# Patient Record
Sex: FEMALE | Race: WHITE | NOT HISPANIC OR LATINO | Employment: UNEMPLOYED | URBAN - METROPOLITAN AREA
[De-identification: names, ages, dates, MRNs, and addresses within clinical notes are randomized per-mention and may not be internally consistent; named-entity substitution may affect disease eponyms.]

---

## 2023-05-01 ENCOUNTER — OFFICE VISIT (OUTPATIENT)
Dept: PRIMARY CARE | Facility: CLINIC | Age: 53
End: 2023-05-01
Payer: COMMERCIAL

## 2023-05-01 VITALS
WEIGHT: 163 LBS | HEART RATE: 70 BPM | BODY MASS INDEX: 25.53 KG/M2 | SYSTOLIC BLOOD PRESSURE: 110 MMHG | DIASTOLIC BLOOD PRESSURE: 76 MMHG

## 2023-05-01 DIAGNOSIS — H93.11 TINNITUS OF RIGHT EAR: Primary | ICD-10-CM

## 2023-05-01 PROCEDURE — 99213 OFFICE O/P EST LOW 20 MIN: CPT | Performed by: INTERNAL MEDICINE
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
## 2023-05-08 ENCOUNTER — TELEPHONE (OUTPATIENT)
Dept: PRIMARY CARE | Facility: CLINIC | Age: 53
End: 2023-05-08

## 2023-05-08 DIAGNOSIS — R42 VERTIGO: Primary | ICD-10-CM

## 2023-05-08 RX ORDER — HYDROXYZINE HYDROCHLORIDE 25 MG/1
50 TABLET, FILM COATED ORAL ONCE
Qty: 2 TABLET | Refills: 0 | Status: SHIPPED | OUTPATIENT
Start: 2023-05-08 | End: 2023-05-08

## 2023-05-08 RX ORDER — MECLIZINE HCL 12.5 MG 12.5 MG/1
12.5 TABLET ORAL 3 TIMES DAILY PRN
Qty: 30 TABLET | Refills: 1 | Status: SHIPPED | OUTPATIENT
Start: 2023-05-08 | End: 2024-05-07

## 2023-05-08 RX ORDER — HYDROXYZINE HYDROCHLORIDE 25 MG/1
50 TABLET, FILM COATED ORAL ONCE
COMMUNITY
End: 2023-05-08 | Stop reason: SDUPTHER

## 2023-05-08 NOTE — PROGRESS NOTES
Patient has developed intermittent episodes of vertigo over the past week.  Most of the episodes have occurred with head movement only.  She does report experiencing fewer shorter episodes of pulsatile tinnitus over the past week.  The patient is scheduled for MRIs later in the week.  I have recommended that she begin vestibular exercises and that she begin use of meclizine 12.5 mg p.o. 3 times daily as needed.  She may require an ENT referral

## 2023-11-02 LAB
GENETICS TEST NAME-DATA CONVERSION: NORMAL
LAB MOLECULAR CA TECHNICAL NOTES: NORMAL

## 2024-05-20 RX ORDER — ANASTROZOLE 1 MG/1
1 TABLET ORAL
COMMUNITY
Start: 2023-12-14

## 2024-05-20 RX ORDER — THIAMINE HCL 100 MG
CAPSULE ORAL
COMMUNITY

## 2024-05-20 RX ORDER — FOLIC ACID 0.8 MG
400 TABLET ORAL
COMMUNITY

## 2024-05-21 ENCOUNTER — APPOINTMENT (OUTPATIENT)
Dept: PRIMARY CARE | Facility: CLINIC | Age: 54
End: 2024-05-21
Payer: COMMERCIAL

## 2024-06-18 ENCOUNTER — APPOINTMENT (OUTPATIENT)
Dept: PRIMARY CARE | Facility: CLINIC | Age: 54
End: 2024-06-18
Payer: COMMERCIAL

## 2024-08-26 DIAGNOSIS — D05.11 DUCTAL CARCINOMA IN SITU (DCIS) OF RIGHT BREAST: Primary | ICD-10-CM

## 2024-08-26 DIAGNOSIS — Z00.00 ROUTINE GENERAL MEDICAL EXAMINATION AT A HEALTH CARE FACILITY: ICD-10-CM

## 2024-08-27 PROBLEM — Z17.31 HUMAN EPIDERMAL GROWTH FACTOR RECEPTOR 2 POSITIVE CARCINOMA OF BREAST: Status: ACTIVE | Noted: 2023-07-11

## 2024-08-27 PROBLEM — N61.0 BREAST INFECTION: Status: ACTIVE | Noted: 2023-07-28

## 2024-08-27 PROBLEM — M51.26 LUMBAR DISC HERNIATION: Status: ACTIVE | Noted: 2024-08-27

## 2024-08-27 PROBLEM — I73.00 RAYNAUD PHENOMENON: Status: ACTIVE | Noted: 2024-08-27

## 2024-08-27 PROBLEM — D05.11 DUCTAL CARCINOMA IN SITU (DCIS) OF RIGHT BREAST: Status: ACTIVE | Noted: 2023-04-15

## 2024-08-27 PROBLEM — M54.16 LUMBAR RADICULOPATHY: Status: ACTIVE | Noted: 2024-08-27

## 2024-08-27 PROBLEM — C50.919: Status: ACTIVE | Noted: 2023-07-11

## 2024-08-27 NOTE — PROGRESS NOTES
Subjective   Patient ID: Katiuska Block is a 54 y.o. female who presents for CPE    HPI   Since beginning use of Arimidex in February, the patient reports intermittent episodes of soreness in the elbows, intermittent episodes of tightness in both hips, intermittent episodes of tightness over the lateral surfaces of both feet.  She reports that the episodes of soreness in the elbows generally are noticeable in the morning.  She reports that the episodes of tightness in the hips and over the lateral surfaces of both feet can be precipitated by the patient getting out of a seated position.  She reports no other associated symptoms.  She reports the presence of an erythematous papular eruption over the anterior surfaces of the proximal ends of the lower legs bilaterally x 3 weeks.  She does report associated itching for several days beginning 3 weeks ago.  She was diagnosed with poison ivy dermatitis 3 weeks ago.    Since her last complete physical examination, the patient reports continued chronic infrequent momentary episodes of a fluttering heartbeat.  She reports that the episodes usually occur at rest and recently have been 20-30 seconds in duration.  She reports that the episodes can be precipitated by ingestion of coffee during the day but also occur in the absence of drinking coffee during the day.  She reports no associated symptoms.  Over the past several months, she reports no change in the frequency, duration, or intensity of the episodes.    Over the past few weeks, the patient reports no pain or drainage from the operative site on the right breast.    Since soon after her emergency department visit in early April, the patient reports no recurrent episodes of vertigo.  No other new complaints.    The patient received her last dosage of Herceptin 2 days ago..    Review of Systems  All systems have been reviewed and are normal except as previously noted  Objective   There were no vitals taken for this  visit.    Physical Exam  Head-palpation revealed no tenderness over the maxillary or frontal sinuses  Eyes-extraocular movements intact pupils equal and reactive to light; fundi revealed good retinal color no hemorrhages or exudates  Ears-palpation of pinnas and traguses revealed no tenderness. External auditory canals not erythematous or swollen. TMs clear.   Nose-turbinates not erythematous or swollen; no septal deviation noted  Mouth-posterior pharynx is not erythematous or swollen. Tonsillar pillars appeared normal; no exudates  Neck no lymphadenopathy. Thyroid gland not enlarged. , No bruits  Breasts-deferred-  Lungs-clear to auscultation bilaterally  Cardiac-rate normal rhythm regular no murmurs no JVD  Abdomen-soft nondistended normal active bowel sounds. Palpation revealed no tenderness or masses., Liver percussed to 9 cm in total span.  Pulses 2+ bilaterally   Extremities-no peripheral edema  Musculoskeletal  Elbows-  Right elbow-no erythema or swelling, Full range of motion in all directions of motion with no pain. Palpation revealed no tenderness or increase in warmth.  Left elbow-no erythema or swelling.  Full range of motion with mild discomfort noted on flexion and extension.  Full range of motion with no pain noted in all other directions of motion.  Palpation did reveal mild tenderness over the olecranon process and over the medial epicondyle, no increase in warmth    Hips-no erythema or swelling.  Windshield wiper test negative.  Full range of motion in all directions of motion with no pain.  Palpation revealed no tenderness or increase in warmth  Left foot-no erythema or swelling, Full range of motion in all directions of motion with no pain. Palpation revealed no tenderness or increase in warmth  Right foot-no erythema or swelling.  Full range of motion in all directions of motion with no pain.  Palpation did reveal mild tenderness over the lateral surface of the foot but no increase in  warmth  Skin-excoriated erythematous papular eruption located over the anterior surface of the proximal ends of the lower legs bilaterally.  No necrotic tissue noted.  No drainage noted.  No surrounding erythema noted.  Palpation revealed no tenderness or increase in warmth  Neurologic  Mental status-alert and oriented x3   Cranial nerves-2 through 12 grossly intact, no visual field abnormalities  Motor-no pronator drift noted, strength-5/5 in all muscle groups tested, , no tremor noted.  No bradykinesia noted.  No rigidity noted.  Negative pull test  Sensory-Light touch sensation fully intact  Pinprick sensation fully intact  Vibratory sensation fully intact  Cerebellar-no truncal ataxia, good coordination finger-nose testing,, good coordination heel-to-shin testing, normal rapid alternating movements  Romberg negative, no abnormality in tandem gait  Reflexes-ankle jerks 1+/4 bilaterally, all other reflexes tested 2+/4  Assessment/Plan        Assessment  Chronic infrequent momentary episodes of fluttering heartbeat--? Secondary to benign palpitations  Benign palpitations  Infiltrating ductal carcinoma right breast status post nipple sparing mastectomy June 22, 2023  Infected seroma right breast status post I&D and placement of a wound VAC July 28, 2023; status post chemotherapy August through November 2023  Status post right breast prepectoral tissue expander placement and breast reconstruction status post implantation of biological implant for soft tissue reinforcement July 22, 2024  Left breast calcifications  Benign breast mass left breast-area of distortion status post removal December 2021  Chronic intermittent episodes of cramping abdominal pain and diarrhea-May be secondary to IBS-D   SBO Lysis of adhesions June 2, 2022  Multiple pelvic abscesses, small bowel perforation status post small bowel resection June 7, 2022   Hemorrhoids  Genitourinary syndrome of menopause  Dysplasia on the labia  Chronic daily  episodes of pallor left third finger cyanosis of the first 3 fingers of the right hand with associated numbness-probably secondary to Raynaud's phenomenon  Intermittent episodes of soreness in the elbows-May be secondary to osteoarthritis, I would wonder whether the patient may be has medial epicondylitis of the left elbow  Intermittent episodes of tightness in the hips-May be secondary to side effect from administration of anastrozole.  Intermittent episodes of tightness noted over the lateral surfaces of both feet-May represent a side effect for administration of anastrozole.  Presence of erythematous scaly papular eruption located at the proximal ends of the anterior surfaces of the lower legs-likely represents the remnant of poison ivy dermatitis  Poison ivy dermatitis  ?  BPPV April 3, 2024  Anxiety    Plan  Obtain CBC differential, CMP, fasting lipid profile, TSH, vitamin D level, vitamin B12 level, cardiac CRP, urinalysis as soon as possible  Obtain EKG today.  I did tell the patient to protect her hands when in the cold and to avoid eating greasy foods.  I told the patient that she could apply Voltaren gel to painful regions and regions of tightness every 6 hours as needed.  The patient will continue to follow-up with her plastic surgeon as she will be more than likely the having the second part of her reconstructive breast surgery in November of this year.  The patient will return for a complete physical examination in 1 year

## 2024-08-28 ENCOUNTER — LAB (OUTPATIENT)
Dept: LAB | Facility: LAB | Age: 54
End: 2024-08-28
Payer: COMMERCIAL

## 2024-08-28 ENCOUNTER — APPOINTMENT (OUTPATIENT)
Dept: PRIMARY CARE | Facility: CLINIC | Age: 54
End: 2024-08-28
Payer: COMMERCIAL

## 2024-08-28 VITALS
BODY MASS INDEX: 26.78 KG/M2 | SYSTOLIC BLOOD PRESSURE: 94 MMHG | TEMPERATURE: 97.4 F | WEIGHT: 171 LBS | DIASTOLIC BLOOD PRESSURE: 76 MMHG | HEART RATE: 84 BPM

## 2024-08-28 DIAGNOSIS — I73.00 RAYNAUD'S PHENOMENON WITHOUT GANGRENE: ICD-10-CM

## 2024-08-28 DIAGNOSIS — D05.11 DUCTAL CARCINOMA IN SITU (DCIS) OF RIGHT BREAST: ICD-10-CM

## 2024-08-28 DIAGNOSIS — C50.919: ICD-10-CM

## 2024-08-28 DIAGNOSIS — H93.11 TINNITUS OF RIGHT EAR: ICD-10-CM

## 2024-08-28 DIAGNOSIS — Z00.00 ROUTINE GENERAL MEDICAL EXAMINATION AT A HEALTH CARE FACILITY: Primary | ICD-10-CM

## 2024-08-28 DIAGNOSIS — Z00.00 ROUTINE GENERAL MEDICAL EXAMINATION AT A HEALTH CARE FACILITY: ICD-10-CM

## 2024-08-28 LAB
25(OH)D3 SERPL-MCNC: 43 NG/ML (ref 30–100)
ALBUMIN SERPL BCP-MCNC: 4.5 G/DL (ref 3.4–5)
ALP SERPL-CCNC: 65 U/L (ref 33–110)
ALT SERPL W P-5'-P-CCNC: 20 U/L (ref 7–45)
ANION GAP SERPL CALC-SCNC: 16 MMOL/L (ref 10–20)
AST SERPL W P-5'-P-CCNC: 25 U/L (ref 9–39)
BASOPHILS # BLD AUTO: 0.07 X10*3/UL (ref 0–0.1)
BASOPHILS NFR BLD AUTO: 1.1 %
BILIRUB SERPL-MCNC: 1.3 MG/DL (ref 0–1.2)
BUN SERPL-MCNC: 10 MG/DL (ref 6–23)
CALCIUM SERPL-MCNC: 9.2 MG/DL (ref 8.6–10.6)
CHLORIDE SERPL-SCNC: 104 MMOL/L (ref 98–107)
CHOLEST SERPL-MCNC: 215 MG/DL (ref 0–199)
CHOLESTEROL/HDL RATIO: 3.7
CO2 SERPL-SCNC: 25 MMOL/L (ref 21–32)
CREAT SERPL-MCNC: 0.71 MG/DL (ref 0.5–1.05)
CRP SERPL HS-MCNC: 1.1 MG/L
EGFRCR SERPLBLD CKD-EPI 2021: >90 ML/MIN/1.73M*2
EOSINOPHIL # BLD AUTO: 0.24 X10*3/UL (ref 0–0.7)
EOSINOPHIL NFR BLD AUTO: 3.8 %
ERYTHROCYTE [DISTWIDTH] IN BLOOD BY AUTOMATED COUNT: 13.1 % (ref 11.5–14.5)
GLUCOSE SERPL-MCNC: 98 MG/DL (ref 74–99)
HCT VFR BLD AUTO: 41.3 % (ref 36–46)
HDLC SERPL-MCNC: 58.1 MG/DL
HGB BLD-MCNC: 13.4 G/DL (ref 12–16)
IMM GRANULOCYTES # BLD AUTO: 0.01 X10*3/UL (ref 0–0.7)
IMM GRANULOCYTES NFR BLD AUTO: 0.2 % (ref 0–0.9)
LDLC SERPL CALC-MCNC: 137 MG/DL
LYMPHOCYTES # BLD AUTO: 2.19 X10*3/UL (ref 1.2–4.8)
LYMPHOCYTES NFR BLD AUTO: 34.4 %
MCH RBC QN AUTO: 31.4 PG (ref 26–34)
MCHC RBC AUTO-ENTMCNC: 32.4 G/DL (ref 32–36)
MCV RBC AUTO: 97 FL (ref 80–100)
MONOCYTES # BLD AUTO: 0.48 X10*3/UL (ref 0.1–1)
MONOCYTES NFR BLD AUTO: 7.5 %
NEUTROPHILS # BLD AUTO: 3.37 X10*3/UL (ref 1.2–7.7)
NEUTROPHILS NFR BLD AUTO: 53 %
NON HDL CHOLESTEROL: 157 MG/DL (ref 0–149)
NRBC BLD-RTO: 0 /100 WBCS (ref 0–0)
PLATELET # BLD AUTO: 240 X10*3/UL (ref 150–450)
POC APPEARANCE, URINE: CLEAR
POC BILIRUBIN, URINE: NEGATIVE
POC BLOOD, URINE: NEGATIVE
POC COLOR, URINE: YELLOW
POC GLUCOSE, URINE: NEGATIVE MG/DL
POC KETONES, URINE: NEGATIVE MG/DL
POC LEUKOCYTES, URINE: NEGATIVE
POC NITRITE,URINE: NEGATIVE
POC PH, URINE: 7 PH
POC PROTEIN, URINE: NEGATIVE MG/DL
POC SPECIFIC GRAVITY, URINE: 1.01
POC UROBILINOGEN, URINE: 0.2 EU/DL
POTASSIUM SERPL-SCNC: 4.7 MMOL/L (ref 3.5–5.3)
PROT SERPL-MCNC: 7 G/DL (ref 6.4–8.2)
PTH-INTACT SERPL-MCNC: 66 PG/ML (ref 18.5–88)
RBC # BLD AUTO: 4.27 X10*6/UL (ref 4–5.2)
SODIUM SERPL-SCNC: 140 MMOL/L (ref 136–145)
TRIGL SERPL-MCNC: 102 MG/DL (ref 0–149)
TSH SERPL-ACNC: 2.7 MIU/L (ref 0.44–3.98)
VIT B12 SERPL-MCNC: 972 PG/ML (ref 211–911)
VLDL: 20 MG/DL (ref 0–40)
WBC # BLD AUTO: 6.4 X10*3/UL (ref 4.4–11.3)

## 2024-08-28 PROCEDURE — 80061 LIPID PANEL: CPT

## 2024-08-28 PROCEDURE — 84443 ASSAY THYROID STIM HORMONE: CPT

## 2024-08-28 PROCEDURE — 81002 URINALYSIS NONAUTO W/O SCOPE: CPT | Performed by: INTERNAL MEDICINE

## 2024-08-28 PROCEDURE — 83970 ASSAY OF PARATHORMONE: CPT

## 2024-08-28 PROCEDURE — 86141 C-REACTIVE PROTEIN HS: CPT

## 2024-08-28 PROCEDURE — 85025 COMPLETE CBC W/AUTO DIFF WBC: CPT

## 2024-08-28 PROCEDURE — 93000 ELECTROCARDIOGRAM COMPLETE: CPT | Performed by: INTERNAL MEDICINE

## 2024-08-28 PROCEDURE — 36415 COLL VENOUS BLD VENIPUNCTURE: CPT

## 2024-08-28 PROCEDURE — 99396 PREV VISIT EST AGE 40-64: CPT | Performed by: INTERNAL MEDICINE

## 2024-08-28 PROCEDURE — 82306 VITAMIN D 25 HYDROXY: CPT

## 2024-08-28 PROCEDURE — 80053 COMPREHEN METABOLIC PANEL: CPT

## 2024-08-28 PROCEDURE — 82607 VITAMIN B-12: CPT

## 2024-08-28 PROCEDURE — 99213 OFFICE O/P EST LOW 20 MIN: CPT | Performed by: INTERNAL MEDICINE

## 2024-08-28 RX ORDER — CALCIUM CARBONATE 300MG(750)
TABLET,CHEWABLE ORAL
COMMUNITY

## 2024-08-28 RX ORDER — WITCH HAZEL 50 %
2000 PADS, MEDICATED (EA) TOPICAL DAILY
COMMUNITY

## 2024-08-29 ENCOUNTER — TELEPHONE (OUTPATIENT)
Dept: PRIMARY CARE | Facility: CLINIC | Age: 54
End: 2024-08-29
Payer: COMMERCIAL

## 2024-08-29 NOTE — TELEPHONE ENCOUNTER
Labs reviewed, I left a message for the patient for us to repeat her fasting lipid profile in 6 months

## 2025-01-20 ENCOUNTER — OFFICE VISIT (OUTPATIENT)
Dept: PRIMARY CARE | Facility: CLINIC | Age: 55
End: 2025-01-20
Payer: COMMERCIAL

## 2025-01-20 VITALS
HEART RATE: 82 BPM | SYSTOLIC BLOOD PRESSURE: 110 MMHG | DIASTOLIC BLOOD PRESSURE: 70 MMHG | WEIGHT: 163.8 LBS | TEMPERATURE: 96.6 F | BODY MASS INDEX: 25.71 KG/M2 | HEIGHT: 67 IN

## 2025-01-20 DIAGNOSIS — N39.0 URINARY TRACT INFECTION WITHOUT HEMATURIA, SITE UNSPECIFIED: ICD-10-CM

## 2025-01-20 LAB
POC APPEARANCE, URINE: ABNORMAL
POC BILIRUBIN, URINE: NEGATIVE
POC BLOOD, URINE: ABNORMAL
POC COLOR, URINE: YELLOW
POC GLUCOSE, URINE: NEGATIVE MG/DL
POC KETONES, URINE: NEGATIVE MG/DL
POC LEUKOCYTES, URINE: ABNORMAL
POC NITRITE,URINE: NEGATIVE
POC PH, URINE: 5.5 PH
POC PROTEIN, URINE: ABNORMAL MG/DL
POC SPECIFIC GRAVITY, URINE: 1.02
POC UROBILINOGEN, URINE: 0.2 EU/DL

## 2025-01-20 PROCEDURE — 81002 URINALYSIS NONAUTO W/O SCOPE: CPT | Performed by: INTERNAL MEDICINE

## 2025-01-20 PROCEDURE — 3008F BODY MASS INDEX DOCD: CPT | Performed by: INTERNAL MEDICINE

## 2025-01-20 PROCEDURE — 87086 URINE CULTURE/COLONY COUNT: CPT

## 2025-01-20 PROCEDURE — 99213 OFFICE O/P EST LOW 20 MIN: CPT | Performed by: INTERNAL MEDICINE

## 2025-01-20 RX ORDER — CIPROFLOXACIN 250 MG/1
250 TABLET, FILM COATED ORAL 2 TIMES DAILY
Qty: 14 TABLET | Refills: 0 | Status: SHIPPED | OUTPATIENT
Start: 2025-01-20 | End: 2025-01-27

## 2025-01-20 NOTE — PROGRESS NOTES
"Subjective   Patient ID: Katiuska Block is a 54 y.o. female who presents for UTI    HPI   The patient reports a history of slight dysuria, urinary urgency, urinary frequency beginning early this morning.  She reports no other associated symptoms.    The patient reports a history of dysuria and a sense of incomplete emptying of the bladder beginning the evening of January 11.  As a result she presented to an urgent care center on January 12.  She was diagnosed with a urinary tract infection.  The patient reports that the dysuria and sense of incomplete emptying of the bladder resolved soon after beginning a course of Macrobid.  January 16, the patient completed a 5-day course of Macrobid for a Klebsiella urinary tract infection.    Urinalysis reviewed  Review of Systems    Objective   Temp 35.9 °C (96.6 °F) (Temporal)   Ht 1.702 m (5' 7\")   Wt 74.3 kg (163 lb 12.8 oz)   BMI 25.65 kg/m²     Physical Exam  Abdomen-soft, nondistended.  Normal active bowel sounds.  Palpation revealed no tenderness or masses  Musculoskeletal  Palpation/percussion of CVAs revealed no tenderness  Assessment/Plan   Problem List Items Addressed This Visit    None  Visit Diagnoses         Codes    Urinary tract infection without hematuria, site unspecified     N39.0    Relevant Orders    POCT UA (nonautomated) manually resulted             Assessment  Dysuria, urinary urgency, urinary frequency-May be secondary to a relapsing or less likely recurrent urinary tract infection.  Pyuria, hematuria-likely secondary to a relapsing or less likely recurrent urinary tract infection  Plan  I have empirically started the patient on Cipro 250 mg p.o. twice daily pending the results of culture.      "

## 2025-01-21 ENCOUNTER — TELEPHONE (OUTPATIENT)
Dept: PRIMARY CARE | Facility: CLINIC | Age: 55
End: 2025-01-21
Payer: COMMERCIAL

## 2025-01-21 LAB — BACTERIA UR CULT: NORMAL

## 2025-01-21 NOTE — TELEPHONE ENCOUNTER
Urine culture is negative, but I asked the patient to complete 6 doses of Cipro and to send me a message with her symptoms after completing the 6 doses

## 2025-03-03 ENCOUNTER — APPOINTMENT (OUTPATIENT)
Dept: UROLOGY | Facility: CLINIC | Age: 55
End: 2025-03-03
Payer: COMMERCIAL

## 2026-05-05 ENCOUNTER — APPOINTMENT (OUTPATIENT)
Dept: PRIMARY CARE | Facility: CLINIC | Age: 56
End: 2026-05-05
Payer: COMMERCIAL